# Patient Record
Sex: MALE | Race: WHITE | URBAN - NONMETROPOLITAN AREA
[De-identification: names, ages, dates, MRNs, and addresses within clinical notes are randomized per-mention and may not be internally consistent; named-entity substitution may affect disease eponyms.]

---

## 2019-05-14 ENCOUNTER — IMPORTED ENCOUNTER (OUTPATIENT)
Dept: URBAN - NONMETROPOLITAN AREA CLINIC 1 | Facility: CLINIC | Age: 49
End: 2019-05-14

## 2019-05-14 PROBLEM — H20.011: Noted: 2019-05-14

## 2019-05-14 PROCEDURE — 99202 OFFICE O/P NEW SF 15 MIN: CPT

## 2019-05-14 NOTE — PATIENT DISCUSSION
Iritis OD -  discussed findings w/patient-  start Pred Forte Q1H OD x 1 day then QID OD x 1 week shake well. Written Rx issued.  -  monitor 1 week f/u Iritis OD or pen

## 2019-05-23 ENCOUNTER — IMPORTED ENCOUNTER (OUTPATIENT)
Dept: URBAN - NONMETROPOLITAN AREA CLINIC 1 | Facility: CLINIC | Age: 49
End: 2019-05-23

## 2019-05-23 PROCEDURE — 99212 OFFICE O/P EST SF 10 MIN: CPT

## 2019-05-23 NOTE — PATIENT DISCUSSION
Resolving Iritis OD -  discussed findings w/patient-  decrease Pred Forte to TID OD for 1 week then begin taper; BID for 1 week QD for 1 week then D/C.- Recommend follow-up PRN

## 2022-04-09 ASSESSMENT — TONOMETRY
OS_IOP_MMHG: 18
OS_IOP_MMHG: 18
OD_IOP_MMHG: 17
OD_IOP_MMHG: 17

## 2022-04-09 ASSESSMENT — VISUAL ACUITY
OU_CC: 20/20
OD_CC: 20/25-1
OD_CC: 20/20-1
OS_CC: 20/20-1
OU_CC: 20/20